# Patient Record
Sex: MALE | Race: OTHER
[De-identification: names, ages, dates, MRNs, and addresses within clinical notes are randomized per-mention and may not be internally consistent; named-entity substitution may affect disease eponyms.]

---

## 2017-09-26 NOTE — NUR
PT A/OX4 BREATHING EFFORTLESSLY ON ROOM AIR, PT STATES HE HAS BEEN HVAING 
DIFUSE ABD PAIN X 1 DAY, PT DENIES N/V/D, PT FAMILY AT BEDSIDE, MD MADE AWARE 
WILL CONTINUE TO MONITOR.

## 2017-09-27 NOTE — NUR
Patient discharged to home in stable condition. Written and verbal after care 
instructions given. Patient verbalizes understanding of instruction. PT TOLD TO 
NOT DRIVE OR WORK WHILE TAKING [place] PT BERBALIZED UNDERSTANDING AND WALKED OUT 
OF THE ER WITH A STEADY A STEADY GAIT AND STATES HE HAS A RIDE HOME AND WILL 
NOT DRIVE HOME.

## 2023-05-29 ENCOUNTER — HOSPITAL ENCOUNTER (EMERGENCY)
Dept: HOSPITAL 54 - ER | Age: 28
Discharge: HOME | End: 2023-05-29
Payer: COMMERCIAL

## 2023-05-29 VITALS — BODY MASS INDEX: 25.77 KG/M2 | HEIGHT: 70 IN | WEIGHT: 180 LBS

## 2023-05-29 VITALS — SYSTOLIC BLOOD PRESSURE: 128 MMHG | DIASTOLIC BLOOD PRESSURE: 85 MMHG
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
## 2023-05-29 DIAGNOSIS — S00.83XA: Primary | ICD-10-CM
